# Patient Record
Sex: FEMALE | Race: AMERICAN INDIAN OR ALASKA NATIVE
[De-identification: names, ages, dates, MRNs, and addresses within clinical notes are randomized per-mention and may not be internally consistent; named-entity substitution may affect disease eponyms.]

---

## 2018-03-28 ENCOUNTER — HOSPITAL ENCOUNTER (EMERGENCY)
Dept: HOSPITAL 5 - ED | Age: 23
Discharge: HOME | End: 2018-03-28
Payer: COMMERCIAL

## 2018-03-28 VITALS — DIASTOLIC BLOOD PRESSURE: 77 MMHG | SYSTOLIC BLOOD PRESSURE: 126 MMHG

## 2018-03-28 DIAGNOSIS — Z3A.15: ICD-10-CM

## 2018-03-28 DIAGNOSIS — O99.512: ICD-10-CM

## 2018-03-28 DIAGNOSIS — Z88.6: ICD-10-CM

## 2018-03-28 DIAGNOSIS — O99.332: ICD-10-CM

## 2018-03-28 DIAGNOSIS — J34.89: ICD-10-CM

## 2018-03-28 DIAGNOSIS — O23.42: Primary | ICD-10-CM

## 2018-03-28 LAB
BACTERIA #/AREA URNS HPF: (no result) /HPF
BILIRUB UR QL STRIP: (no result)
BLOOD UR QL VISUAL: (no result)
MUCOUS THREADS #/AREA URNS HPF: (no result) /HPF
PH UR STRIP: 6 [PH] (ref 5–7)
RBC #/AREA URNS HPF: 14 /HPF (ref 0–6)
UROBILINOGEN UR-MCNC: 4 MG/DL (ref ?–2)
WBC #/AREA URNS HPF: 18 /HPF (ref 0–6)

## 2018-03-28 PROCEDURE — 81025 URINE PREGNANCY TEST: CPT

## 2018-03-28 PROCEDURE — 99283 EMERGENCY DEPT VISIT LOW MDM: CPT

## 2018-03-28 PROCEDURE — 81001 URINALYSIS AUTO W/SCOPE: CPT

## 2018-03-28 PROCEDURE — 94640 AIRWAY INHALATION TREATMENT: CPT

## 2018-03-28 PROCEDURE — 87086 URINE CULTURE/COLONY COUNT: CPT

## 2018-03-28 NOTE — EMERGENCY DEPARTMENT REPORT
Blank Doc





- Documentation


Documentation: 





Patient is a 23-year-old female comes in with cough and wheezing and vaginal 

discharge patient is pregnant.  We'll give albuterol ipratropium will get 

urinalysis.

## 2018-03-28 NOTE — EMERGENCY DEPARTMENT REPORT
- General


Chief Complaint: Upper Respiratory Infection


Stated Complaint: URI


Time Seen by Provider: 18 13:59


Source: patient


Mode of arrival: Ambulatory


Limitations: No Limitations





- History of Present Illness


Initial Comments: 





This is a 23-year-old female  nontoxic, well nourished in appearance, no 

acute signs of distress presents to the ED with c/o of productive cough, nausea

, rhinorrhea, nasal congestion x2 days.  Patient describes productive cough as 

yellow mucus production.  Patient denies any sick contact.  Patient denies any 

recent travels, long car, recent hospital stays.  Patient denies any calf pain 

or calf tenderness.  Patient denies any chest pain, short of breath, fever, 

chills, nausea, vomiting, hemoptysis, numbness, tingling, headache or stiff 

neck.  Patient denies any vaginal bleeding or abdominal pain. Patient states 

she is 15 weeks pregnant and has following up with Dr. Hassan with normal 

pregnancy. Patient is also c/o of dysuria and vaginal discharge and describes 

vaginal discharge as white with follow-up.  Patient denies any concerns about 

STD.  Patient denies any past medical history.  Allergies includes acetaminophen

, NSAIDs and oxycodone.


MD Complaint: cough, rhinorrhea, nasal congestion, other (nausea)


-: days(s)


Severity: mild


Severity scale (0 -10): 2


Quality: burning


Consistency: constant


Improves With: nothing


Worsens With: nothing


Associated Symptoms: rhinorrhea, nasal congestion, cough, dysuria.  denies: 

fever, chills, myalgias, diaphoresis, headache, sore throat, stiff neck, chest 

pain, shortness of breath, abdominal pain, nausea, vomiting, diarrhea, rash, 

confusion, right sweats, weight loss, epistaxis, hoarseness, ear pain


Treatments Prior to Arrival: none





- Related Data


 Previous Rx's











 Medication  Instructions  Recorded  Last Taken  Type


 


Azithromycin [Zithromax Z-GINETTE] 250 mg PO DAILY #6 tablet 18 Unknown Rx


 


Metoclopramide [Reglan] 10 mg PO TID PRN #30 tab 18 Unknown Rx


 


Nitrofurantoin Mono/M-Cryst 100 mg PO Q12HR #14 capsule 18 Unknown Rx





[Macrobid CAP]    


 


metroNIDAZOLE [Flagyl] 500 mg PO Q12HR #14 tab 18 Unknown Rx











 Allergies











Allergy/AdvReac Type Severity Reaction Status Date / Time


 


acetaminophen [From Percocet] AdvReac  Shortness Verified 18 11:07





   of Breath  


 


NSAIDS (Non-Steroidal AdvReac  Shortness Verified 18 11:07





Anti-Inflamma   of Breath  


 


oxycodone [From Percocet] AdvReac  Shortness Verified 18 11:07





   of Breath  














ED Review of Systems


ROS: 


Stated complaint: URI


Other details as noted in HPI





Constitutional: denies: chills, fever


Eyes: denies: eye pain, eye discharge, vision change


ENT: denies: ear pain, throat pain


Respiratory: cough.  denies: shortness of breath, wheezing


Cardiovascular: denies: chest pain, palpitations


Endocrine: no symptoms reported


Gastrointestinal: denies: abdominal pain, nausea, diarrhea


Genitourinary: dysuria, frequency.  denies: urgency, discharge


Musculoskeletal: denies: back pain, joint swelling, arthralgia


Skin: denies: rash, lesions


Neurological: denies: headache, weakness, paresthesias


Psychiatric: denies: anxiety, depression


Hematological/Lymphatic: denies: easy bleeding, easy bruising





ED Past Medical Hx





- Past Medical History


Previous Medical History?: No





- Surgical History


Additional Surgical History: c sect





- Social History


Smoking Status: Current Every Day Smoker


Substance Use Type: None





- Medications


Home Medications: 


 Home Medications











 Medication  Instructions  Recorded  Confirmed  Last Taken  Type


 


Azithromycin [Zithromax Z-GINETTE] 250 mg PO DAILY #6 tablet 18  Unknown Rx


 


Metoclopramide [Reglan] 10 mg PO TID PRN #30 tab 18  Unknown Rx


 


Nitrofurantoin Mono/M-Cryst 100 mg PO Q12HR #14 capsule 18  Unknown Rx





[Macrobid CAP]     


 


metroNIDAZOLE [Flagyl] 500 mg PO Q12HR #14 tab 18  Unknown Rx














ED Physical Exam





- General


Limitations: No Limitations


General appearance: alert, in no apparent distress





- Head


Head exam: Present: atraumatic, normocephalic





- Eye


Eye exam: Present: normal appearance


Pupils: Present: normal accommodation





- ENT


ENT exam: Present: normal exam, normal orophraynx, mucous membranes moist





- Neck


Neck exam: Present: normal inspection, full ROM.  Absent: tenderness, 

meningismus, lymphadenopathy





- Respiratory


Respiratory exam: Present: normal lung sounds bilaterally.  Absent: respiratory 

distress, wheezes, rales, rhonchi, stridor, chest wall tenderness, accessory 

muscle use, decreased breath sounds, prolonged expiratory





- Cardiovascular


Cardiovascular Exam: Present: regular rate, normal rhythm, normal heart sounds.

  Absent: irregular rhythm, systolic murmur, diastolic murmur, rubs, gallop





- GI/Abdominal


GI/Abdominal exam: Present: soft, normal bowel sounds.  Absent: distended, 

tenderness, guarding, rebound, rigid, diminished bowel sounds





- Rectal


Rectal exam: Present: deferred





- Extremities Exam


Extremities exam: Present: normal inspection, full ROM, normal capillary 

refill.  Absent: calf tenderness





- Back Exam


Back exam: Present: normal inspection, full ROM.  Absent: tenderness, CVA 

tenderness (R), CVA tenderness (L)





- Neurological Exam


Neurological exam: Present: alert, oriented X3, normal gait, reflexes normal





- Psychiatric


Psychiatric exam: Present: normal affect, normal mood





- Skin


Skin exam: Present: warm, dry, intact, normal color.  Absent: rash





ED Course





 Vital Signs











  18





  11:08 12:30 12:57


 


Temperature 98.9 F  


 


Pulse Rate 91 H  


 


Pulse Rate [  107 H 102 H





Anterior]   


 


Respiratory 22  





Rate   


 


Respiratory  18 18





Rate [Anterior]   


 


Blood Pressure 126/77  


 


O2 Sat by Pulse 98  





Oximetry   














- Reevaluation(s)


Reevaluation #1: 





18 14:07


Patient is speaking in full sentences with no signs of distress noted.





- Consultations


Consultation #1: 





18 14:07


Patient has been consulted with Dr. Chau about patient history, physical exam, 

and labs and examined and screened patient and agrees to ED plan of care and 

discharge plan of care. 





ED Medical Decision Making





- Medical Decision Making





this is a 23-year-old female that presents with upper restaurant infection, 

UTI.  Patient is stable and was examined by me and Dr. Chau. As per Dr. Chau, 

no symptoms of PE. No Chest x-ray to be obtained due to pregnancy and just 

treat empirically.  Due to patient having symptoms of upper respiratory 

infection and worsening I will treat patient empirically with zpak.  Patient 

was instructed to increase hydration, rest.  Patient received DuoNeb, prednisone

, Reglan in the ED.  patient stated that symptoms has significantly improved 

and subsided.  Vitals stable. Patient is nonfebrile and normal heart rate. 

Patient was orally hydrated and patient tolerated well known nausea or 

vomiting.  Patient was instructed Follow-up with a primary care doctor in 3-5 

days or if symptoms worsen and continue return to emergency room as soon as 

possible.  At time time of discharge, the patient does not seem toxic or ill in 

appearance.  No acute signs of distress noted.  Patient agrees to discharge 

treatment plan of care.  No further questions noted by the patient.


Critical care attestation.: 


If time is entered above; I have spent that time in minutes in the direct care 

of this critically ill patient, excluding procedure time.








ED Disposition


Clinical Impression: 


Upper respiratory infection


Qualifiers:


 URI type: unspecified URI Qualified Code(s): J06.9 - Acute upper respiratory 

infection, unspecified





UTI (urinary tract infection)


Qualifiers:


 Urinary tract infection type: site unspecified Hematuria presence: without 

hematuria Qualified Code(s): N39.0 - Urinary tract infection, site not specified





Disposition:  TO HOME OR SELFCARE


Is pt being admited?: No


Does the pt Need Aspirin: No


Condition: Stable


Instructions:  Pregnancy (ED), Upper Respiratory Infection (ED), Azithromycin (

By mouth), Urinary Tract Infection in Women (ED)


Additional Instructions: 


Follow-up with a primary care doctor in 3-5 days or if symptoms worsen and 

continue return to emergency room as soon as possible. 


Do not consume any alcohol while taking antibiotics.


Prescriptions: 


Azithromycin [Zithromax Z-GINETTE] 250 mg PO DAILY #6 tablet


Metoclopramide [Reglan] 10 mg PO TID PRN #30 tab


 PRN Reason: Nausea


metroNIDAZOLE [Flagyl] 500 mg PO Q12HR #14 tab


Nitrofurantoin Mono/M-Cryst [Macrobid CAP] 100 mg PO Q12HR #14 capsule


Referrals: 


[facility],MD [Primary Care Provider] - 3-5 Days


MILY CHRISTIAN MD [Staff Physician] - 3-5 Days


[facility] [Outside] - 3-5 Days


[facility] [Outside] - 3-5 Days


KELSEA DIAZ MD [Staff Physician] - 3-5 Days


[facility] [Provider Group] - 3-5 Days


Forms:  Work/School Release Form(ED)